# Patient Record
Sex: FEMALE | Race: WHITE | URBAN - METROPOLITAN AREA
[De-identification: names, ages, dates, MRNs, and addresses within clinical notes are randomized per-mention and may not be internally consistent; named-entity substitution may affect disease eponyms.]

---

## 2017-04-25 ENCOUNTER — APPOINTMENT (RX ONLY)
Dept: URBAN - METROPOLITAN AREA CLINIC 169 | Facility: CLINIC | Age: 67
Setting detail: DERMATOLOGY
End: 2017-04-25

## 2017-04-25 DIAGNOSIS — L63.8 OTHER ALOPECIA AREATA: ICD-10-CM

## 2017-04-25 PROCEDURE — 99202 OFFICE O/P NEW SF 15 MIN: CPT

## 2017-04-25 PROCEDURE — ? COUNSELING

## 2017-04-25 PROCEDURE — ? PRESCRIPTION

## 2017-04-25 PROCEDURE — ? TREATMENT REGIMEN

## 2017-04-25 PROCEDURE — ? DIAGNOSIS COMMENT

## 2017-04-25 RX ORDER — FLUOCINONIDE 0.5 MG/ML
1 SOLUTION TOPICAL QD
Qty: 1 | Refills: 0 | Status: ERX | COMMUNITY
Start: 2017-04-25

## 2017-04-25 RX ADMIN — FLUOCINONIDE 1: 0.5 SOLUTION TOPICAL at 00:00

## 2017-04-25 ASSESSMENT — SEVERITY ASSESSMENT OVERALL AMONG ALL PATIENTS
IN YOUR EXPERIENCE, AMONG ALL PATIENTS YOU HAVE SEEN WITH THIS CONDITION, HOW SEVERE IS THIS PATIENT'S CONDITION?: S2 (25-49% HAIR LOSS)

## 2017-04-25 NOTE — PROCEDURE: TREATMENT REGIMEN
Otc Regimen: Rogaine for men 5% foam or solution daily
Detail Level: Zone
Plan: Patient informed to follow up with PCP for further workup/ treatment on the abnormal thyroid\\n\\nDiscussed squaric acid as an option in the future
Initiate Treatment: Fluocinonide solution apply daily to the scalp

## 2017-04-25 NOTE — PROCEDURE: DIAGNOSIS COMMENT
Comment: Lab work came back that thyroid was \"abnormal\" from PCP, patient does not have results today but states is being further worked up. \\nHad a previous reaction to oral prednisone many years ago so  ILK is not an option.  She will do a test spot of the topical steroid (fluocinonide solution) to ensure she tolerates it well and will not treat when alone.
Detail Level: Detailed

## 2017-04-25 NOTE — HPI: HAIR LOSS
How Did The Hair Loss Occur?: sudden in onset
How Severe Is Your Hair Loss?: moderate
What Hair Products Do You Use?: Root 600

## 2017-05-03 ENCOUNTER — APPOINTMENT (RX ONLY)
Dept: URBAN - METROPOLITAN AREA CLINIC 169 | Facility: CLINIC | Age: 67
Setting detail: DERMATOLOGY
End: 2017-05-03

## 2017-05-03 DIAGNOSIS — L63.8 OTHER ALOPECIA AREATA: ICD-10-CM

## 2017-05-03 PROCEDURE — ? SQUARIC ACID CONTACTANT THERAPY

## 2017-05-03 PROCEDURE — ? PRESCRIPTION

## 2017-05-03 PROCEDURE — ? COUNSELING

## 2017-05-03 PROCEDURE — 99213 OFFICE O/P EST LOW 20 MIN: CPT

## 2017-05-03 PROCEDURE — ? TREATMENT REGIMEN

## 2017-05-03 PROCEDURE — ? DIAGNOSIS COMMENT

## 2017-05-03 RX ORDER — TRIAMCINOLONE ACETONIDE 1 MG/G
CREAM TOPICAL QD
Qty: 1 | Refills: 0 | Status: ERX | COMMUNITY
Start: 2017-05-03

## 2017-05-03 RX ADMIN — TRIAMCINOLONE ACETONIDE 1: 1 CREAM TOPICAL at 00:00

## 2017-05-03 ASSESSMENT — LOCATION ZONE DERM: LOCATION ZONE: ARM

## 2017-05-03 ASSESSMENT — LOCATION DETAILED DESCRIPTION DERM: LOCATION DETAILED: RIGHT PROXIMAL POSTERIOR UPPER ARM

## 2017-05-03 ASSESSMENT — LOCATION SIMPLE DESCRIPTION DERM: LOCATION SIMPLE: RIGHT POSTERIOR UPPER ARM

## 2017-05-03 NOTE — PROCEDURE: TREATMENT REGIMEN
Initiate Treatment: Triamcinolone 0.1% cream Apply to affected area up to 4 times a day if needed for intense reaction
Plan: Squaric acid immunotherapy sensitization on the right arm today.  Expected pruritus and inflammatory reaction described in detail.  Patient to call or return to clinic with any concerns.  Plan to treat scalp at follow up
Detail Level: Zone

## 2017-05-03 NOTE — PROCEDURE: DIAGNOSIS COMMENT
Comment: Had a previous reaction to oral prednisone many years ago so  ILK is not an option.  We discussed squaric acid as a treatment. All side effects were explained, all questions were answered
Detail Level: Detailed

## 2017-05-03 NOTE — PROCEDURE: SQUARIC ACID CONTACTANT THERAPY
Consent: Verbal Consent was obtained from the patient. The risks of squaric acid application were discussed in detail. Specifically, the risks of redness, itching, pain and allergic reactions were addressed. Prior to application all of the patient's questions were answered.
Detail Level: Detailed
Strength: Squaric Acid 5%
Post-Care Instructions: I reviewed with the patient in detail post-care instructions. The patient understands that the treated areas should be washed off 4 to 6 hours after application. They were instructed to call with any concerns.

## 2017-05-17 ENCOUNTER — APPOINTMENT (RX ONLY)
Dept: URBAN - METROPOLITAN AREA CLINIC 169 | Facility: CLINIC | Age: 67
Setting detail: DERMATOLOGY
End: 2017-05-17

## 2017-05-17 DIAGNOSIS — L63.8 OTHER ALOPECIA AREATA: ICD-10-CM

## 2017-05-17 PROCEDURE — 99213 OFFICE O/P EST LOW 20 MIN: CPT

## 2017-05-17 PROCEDURE — ? PRESCRIPTION

## 2017-05-17 PROCEDURE — ? COUNSELING

## 2017-05-17 PROCEDURE — ? SQUARIC ACID CONTACTANT THERAPY

## 2017-05-17 PROCEDURE — ? TREATMENT REGIMEN

## 2017-05-17 RX ORDER — TRIAMCINOLONE ACETONIDE 1 MG/G
1 CREAM TOPICAL QD
Qty: 1 | Refills: 0 | Status: ERX

## 2017-05-17 ASSESSMENT — LOCATION SIMPLE DESCRIPTION DERM
LOCATION SIMPLE: RIGHT EYEBROW
LOCATION SIMPLE: SCALP
LOCATION SIMPLE: LEFT POSTERIOR UPPER ARM
LOCATION SIMPLE: LEFT EYEBROW

## 2017-05-17 ASSESSMENT — LOCATION DETAILED DESCRIPTION DERM
LOCATION DETAILED: LEFT CENTRAL EYEBROW
LOCATION DETAILED: LEFT DISTAL POSTERIOR UPPER ARM
LOCATION DETAILED: LEFT SUPERIOR PARIETAL SCALP
LOCATION DETAILED: RIGHT CENTRAL EYEBROW
LOCATION DETAILED: LEFT PROXIMAL LATERAL POSTERIOR UPPER ARM

## 2017-05-17 ASSESSMENT — LOCATION ZONE DERM
LOCATION ZONE: FACE
LOCATION ZONE: SCALP
LOCATION ZONE: ARM

## 2017-05-17 NOTE — PROCEDURE: TREATMENT REGIMEN
Plan: Squaric acid %10 immunotherapy sensitization on the left arm today (no reaction from 5% squaric acid).  Expected pruritus and inflammatory reaction described in detail.  Patient to call or return to clinic with any concerns.  Plan to treat scalp at follow up
Detail Level: Zone
Initiate Treatment: Triamcinolone 0.1% cream Apply to affected area up to 4 times a day if needed for intense reaction

## 2017-05-17 NOTE — PROCEDURE: SQUARIC ACID CONTACTANT THERAPY
Post-Care Instructions: I reviewed with the patient in detail post-care instructions. The patient understands that the treated areas should be washed off 4 to 6 hours after application. They were instructed to call with any concerns.
Detail Level: Detailed
Consent: Verbal Consent was obtained from the patient. The risks of squaric acid application were discussed in detail. Specifically, the risks of redness, itching, pain and allergic reactions were addressed. Prior to application all of the patient's questions were answered.
Strength: Squaric Acid 5%

## 2017-06-08 ENCOUNTER — APPOINTMENT (RX ONLY)
Dept: URBAN - METROPOLITAN AREA CLINIC 169 | Facility: CLINIC | Age: 67
Setting detail: DERMATOLOGY
End: 2017-06-08

## 2017-06-08 DIAGNOSIS — L63.8 OTHER ALOPECIA AREATA: ICD-10-CM

## 2017-06-08 PROCEDURE — ? SQUARIC ACID CONTACTANT THERAPY

## 2017-06-08 PROCEDURE — ? TREATMENT REGIMEN

## 2017-06-08 PROCEDURE — 99213 OFFICE O/P EST LOW 20 MIN: CPT

## 2017-06-08 PROCEDURE — ? COUNSELING

## 2017-06-08 PROCEDURE — ? DIAGNOSIS COMMENT

## 2017-06-08 ASSESSMENT — LOCATION DETAILED DESCRIPTION DERM
LOCATION DETAILED: SUPERIOR MID FOREHEAD
LOCATION DETAILED: RIGHT CENTRAL EYEBROW
LOCATION DETAILED: LEFT SUPERIOR PARIETAL SCALP
LOCATION DETAILED: RIGHT CENTRAL PARIETAL SCALP
LOCATION DETAILED: LEFT CENTRAL EYEBROW
LOCATION DETAILED: MID-FRONTAL SCALP

## 2017-06-08 ASSESSMENT — LOCATION SIMPLE DESCRIPTION DERM
LOCATION SIMPLE: LEFT EYEBROW
LOCATION SIMPLE: ANTERIOR SCALP
LOCATION SIMPLE: SUPERIOR FOREHEAD
LOCATION SIMPLE: RIGHT EYEBROW
LOCATION SIMPLE: SCALP

## 2017-06-08 ASSESSMENT — LOCATION ZONE DERM
LOCATION ZONE: FACE
LOCATION ZONE: SCALP

## 2017-06-08 NOTE — PROCEDURE: TREATMENT REGIMEN
Plan: Patient has Triamcinolone cream on hand in case reaction becomes too intense and uncomfortable.  Will call me if that does not control any reaction.  Expected results and treatment course again reviewed with the patient. Discussed sun protection
Detail Level: Zone

## 2017-06-08 NOTE — PROCEDURE: SQUARIC ACID CONTACTANT THERAPY
Post-Care Instructions: I reviewed with the patient in detail post-care instructions. The patient understands that the treated areas should be washed off 4 to 6 hours after application. They were instructed to call with any concerns.
Consent: Verbal Consent was obtained from the patient. The risks of squaric acid application were discussed in detail. Specifically, the risks of redness, itching, pain and allergic reactions were addressed. Prior to application all of the patient's questions were answered.
Strength: Squaric Acid 10%
Detail Level: Simple

## 2017-06-08 NOTE — PROCEDURE: DIAGNOSIS COMMENT
Detail Level: Detailed
Comment: Squaric acid %10 immunotherapy sensitization on the left arm had a  strong reaction a week after application. Did not require triamcinolone cream.

## 2017-06-19 ENCOUNTER — RX ONLY (OUTPATIENT)
Age: 67
Setting detail: RX ONLY
End: 2017-06-19

## 2017-06-19 RX ORDER — FLUOCINOLONE ACETONIDE 0.11 MG/ML
1 OIL TOPICAL BID
Qty: 1 | Refills: 0 | Status: ERX | COMMUNITY
Start: 2017-06-19

## 2017-06-29 ENCOUNTER — APPOINTMENT (RX ONLY)
Dept: URBAN - METROPOLITAN AREA CLINIC 169 | Facility: CLINIC | Age: 67
Setting detail: DERMATOLOGY
End: 2017-06-29

## 2017-06-29 DIAGNOSIS — L82.1 OTHER SEBORRHEIC KERATOSIS: ICD-10-CM

## 2017-06-29 DIAGNOSIS — L63.8 OTHER ALOPECIA AREATA: ICD-10-CM

## 2017-06-29 DIAGNOSIS — L70.8 OTHER ACNE: ICD-10-CM

## 2017-06-29 PROCEDURE — ? DIAGNOSIS COMMENT

## 2017-06-29 PROCEDURE — ? COUNSELING

## 2017-06-29 PROCEDURE — 99213 OFFICE O/P EST LOW 20 MIN: CPT

## 2017-06-29 PROCEDURE — ? TREATMENT REGIMEN

## 2017-06-29 PROCEDURE — ? SQUARIC ACID CONTACTANT THERAPY

## 2017-06-29 ASSESSMENT — LOCATION DETAILED DESCRIPTION DERM
LOCATION DETAILED: MID-FRONTAL SCALP
LOCATION DETAILED: RIGHT CENTRAL PARIETAL SCALP
LOCATION DETAILED: LEFT SUPERIOR PARIETAL SCALP
LOCATION DETAILED: RIGHT CENTRAL EYEBROW
LOCATION DETAILED: SUPERIOR MID FOREHEAD
LOCATION DETAILED: RIGHT INFERIOR MEDIAL MALAR CHEEK
LOCATION DETAILED: LEFT CENTRAL EYEBROW
LOCATION DETAILED: LEFT SUPERIOR MEDIAL MALAR CHEEK

## 2017-06-29 ASSESSMENT — LOCATION ZONE DERM
LOCATION ZONE: FACE
LOCATION ZONE: SCALP

## 2017-06-29 ASSESSMENT — LOCATION SIMPLE DESCRIPTION DERM
LOCATION SIMPLE: RIGHT CHEEK
LOCATION SIMPLE: SUPERIOR FOREHEAD
LOCATION SIMPLE: RIGHT EYEBROW
LOCATION SIMPLE: ANTERIOR SCALP
LOCATION SIMPLE: LEFT EYEBROW
LOCATION SIMPLE: SCALP
LOCATION SIMPLE: LEFT CHEEK

## 2017-06-29 NOTE — PROCEDURE: DIAGNOSIS COMMENT
Comment: Today I was not as generous with the squaric acid application as patient had a more significant inflammatory reaction to first treatment.  Patient is comfortable doing squaric acid 10% today with only applying a thin layer and declines no treatment or 5% only.  She did have significant hair regrowth.
Detail Level: Detailed

## 2017-06-29 NOTE — PROCEDURE: TREATMENT REGIMEN
Detail Level: Zone
Otc Regimen: Patient has OTC oil that helps with irritation
Discontinue Regimen: Triamcinolone 0.1% cream, patient did not tolerate well
Plan: If patient is not able to tolerate reaction with her current otc oil she will call and we will inform her how to get the derma smoothe oil coupon

## 2017-06-29 NOTE — PROCEDURE: SQUARIC ACID CONTACTANT THERAPY
Consent: Verbal Consent was obtained from the patient. The risks of squaric acid application were discussed in detail. Specifically, the risks of redness, itching, pain and allergic reactions were addressed. Prior to application all of the patient's questions were answered.
Strength: Squaric Acid 10%
Post-Care Instructions: I reviewed with the patient in detail post-care instructions. The patient understands that the treated areas should be washed off 4 to 6 hours after application. They were instructed to call with any concerns.
Detail Level: Simple

## 2017-07-27 ENCOUNTER — APPOINTMENT (RX ONLY)
Dept: URBAN - METROPOLITAN AREA CLINIC 169 | Facility: CLINIC | Age: 67
Setting detail: DERMATOLOGY
End: 2017-07-27

## 2017-07-27 DIAGNOSIS — L63.8 OTHER ALOPECIA AREATA: ICD-10-CM | Status: IMPROVED

## 2017-07-27 PROBLEM — L85.3 XEROSIS CUTIS: Status: ACTIVE | Noted: 2017-07-27

## 2017-07-27 PROBLEM — L29.8 OTHER PRURITUS: Status: ACTIVE | Noted: 2017-07-27

## 2017-07-27 PROCEDURE — ? TREATMENT REGIMEN

## 2017-07-27 PROCEDURE — 99213 OFFICE O/P EST LOW 20 MIN: CPT

## 2017-07-27 PROCEDURE — ? COUNSELING

## 2017-07-27 ASSESSMENT — LOCATION DETAILED DESCRIPTION DERM: LOCATION DETAILED: LEFT SUPERIOR PARIETAL SCALP

## 2017-07-27 ASSESSMENT — LOCATION ZONE DERM: LOCATION ZONE: SCALP

## 2017-07-27 ASSESSMENT — LOCATION SIMPLE DESCRIPTION DERM: LOCATION SIMPLE: SCALP

## 2017-07-27 NOTE — PROCEDURE: TREATMENT REGIMEN
Samples Given: Neutrogena Hydroboost gel cream to apply to the face
Plan: Patient declines any prescription medications to help with itching at this time.  She will call if she would like me to call in dermasmooth or hydrocortisone cream, at this time she would like to save the money.  I counseled her that scalp does reveal significant new hair growth and there is no erythema, blisters, or appreciated inflammation at this time.  She will notify me should itching not resolve or should she continue to lose hair.  No treatments indicated at this time.  All questions answered.  Patient may dye her hair and have it styled/cut once itching has resolved as it could cause further irritation to her scalp at this time.
Initiate Treatment: Taclonex solution apply 4 drops into scalp BID to help with itching (samples only provided)
Detail Level: Zone

## 2019-03-19 ENCOUNTER — OFFICE VISIT (OUTPATIENT)
Dept: URBAN - METROPOLITAN AREA CLINIC 32 | Facility: CLINIC | Age: 69
End: 2019-03-19
Payer: COMMERCIAL

## 2019-03-19 DIAGNOSIS — H40.1131 PRIMARY OPEN-ANGLE GLAUCOMA, BILATERAL, MILD STAGE: Primary | ICD-10-CM

## 2019-03-19 PROCEDURE — 99213 OFFICE O/P EST LOW 20 MIN: CPT | Performed by: OPHTHALMOLOGY

## 2019-03-19 RX ORDER — TIMOLOL MALEATE 5 MG/ML
0.5 % SOLUTION/ DROPS OPHTHALMIC
Qty: 15 | Refills: 3 | Status: INACTIVE
Start: 2019-03-19 | End: 2019-05-08

## 2019-03-19 ASSESSMENT — KERATOMETRY
OS: 46.00
OD: 43.13

## 2019-03-19 ASSESSMENT — INTRAOCULAR PRESSURE
OD: 15
OS: 15

## 2019-03-19 NOTE — IMPRESSION/PLAN
Impression: Dry eye syndrome of bilateral lacrimal glands: H04.123. Condition: quality of life issue. Symptoms: will treat with meds. Plan: Discussed diagnosis in detail with patient. Discussed treatment options with patient. Discussed risks of progression. Patient instructed to use artificial tears as needed. Recommend oral omega 3, use humidifier, avoid fans.

## 2019-03-19 NOTE — IMPRESSION/PLAN
Impression: Primary open-angle glaucoma, bilateral, mild stage: H40.1131. IOP efectively lowered with Timolol, ONH stable OU, Pach's normal no need to adjust IOP's Plan: Discussed diagnosis in detail with patient. Discussed treatment options with patient. Current therapy is best for patient. Continue using current medication(s). Timolol QD OU. Will continue to observe condition and or symptoms. Call if [address] worsens.

## 2019-04-11 ENCOUNTER — OFFICE VISIT (OUTPATIENT)
Dept: URBAN - METROPOLITAN AREA CLINIC 32 | Facility: CLINIC | Age: 69
End: 2019-04-11
Payer: COMMERCIAL

## 2019-04-11 DIAGNOSIS — H52.4 PRESBYOPIA: ICD-10-CM

## 2019-04-11 PROCEDURE — 92014 COMPRE OPH EXAM EST PT 1/>: CPT | Performed by: OPTOMETRIST

## 2019-04-11 PROCEDURE — 92015 DETERMINE REFRACTIVE STATE: CPT | Performed by: OPTOMETRIST

## 2019-04-11 PROCEDURE — 92004 COMPRE OPH EXAM NEW PT 1/>: CPT | Performed by: OPTOMETRIST

## 2019-04-11 ASSESSMENT — VISUAL ACUITY
OD: 20/20
OS: 20/25

## 2019-04-11 ASSESSMENT — INTRAOCULAR PRESSURE
OS: 12
OD: 12

## 2019-10-01 ENCOUNTER — OFFICE VISIT (OUTPATIENT)
Dept: URBAN - METROPOLITAN AREA CLINIC 32 | Facility: CLINIC | Age: 69
End: 2019-10-01
Payer: COMMERCIAL

## 2019-10-01 PROCEDURE — 99213 OFFICE O/P EST LOW 20 MIN: CPT | Performed by: OPHTHALMOLOGY

## 2019-10-01 PROCEDURE — 92133 CPTRZD OPH DX IMG PST SGM ON: CPT | Performed by: OPHTHALMOLOGY

## 2019-10-01 ASSESSMENT — VISUAL ACUITY
OD: 20/20
OS: 20/20

## 2019-10-01 ASSESSMENT — KERATOMETRY
OD: 42.88
OS: 45.75

## 2019-10-01 NOTE — IMPRESSION/PLAN
Impression: Presbyopia: H52.4. Plan: Discussed diagnosis in detail with patient. Discussed treatment options with patient. New glasses Rx was given today.

## 2019-10-01 NOTE — IMPRESSION/PLAN
Impression: Age-related nuclear cataract, bilateral: H25.13. Plan: Discussed diagnosis in detail with patient. No treatment is required at this time. Discussed risks of progression. Will continue to observe condition and or symptoms. Call if [address] worsens.

## 2019-10-01 NOTE — IMPRESSION/PLAN
Impression: Primary open-angle glaucoma, bilateral, mild stage: H40.1131. IOP efectively lowered with Timolol, ONH stable OU, Pach's normal no need to adjust IOP's Plan: Discussed diagnosis in detail with patient. Discussed treatment options with patient. Current therapy is best for patient. Continue using current medication(s). Timolol QD OU. Will continue to observe condition and or symptoms. Call if [address] worsens.

## 2020-05-19 ENCOUNTER — OFFICE VISIT (OUTPATIENT)
Dept: URBAN - METROPOLITAN AREA CLINIC 32 | Facility: CLINIC | Age: 70
End: 2020-05-19
Payer: COMMERCIAL

## 2020-05-19 PROCEDURE — 92133 CPTRZD OPH DX IMG PST SGM ON: CPT | Performed by: OPTOMETRIST

## 2020-05-19 PROCEDURE — 92014 COMPRE OPH EXAM EST PT 1/>: CPT | Performed by: OPTOMETRIST

## 2020-05-19 ASSESSMENT — INTRAOCULAR PRESSURE
OD: 21
OS: 20

## 2020-05-19 NOTE — IMPRESSION/PLAN
Impression: Primary open-angle glaucoma, bilateral, mild stage: H40.1131. IOP efectively lowered with Timolol, ONH stable OU, Pach's normal no need to adjust IOP's Plan: Discussed diagnosis in detail with patient. Discussed treatment options with patient. Current therapy is best for patient. Continue using current medication(s). Timolol QD OU. Will continue to observe condition and or symptoms. Call if [address] worsens.

## 2020-05-19 NOTE — IMPRESSION/PLAN
Impression: Age-related nuclear cataract, bilateral: H25.13. Plan: Cataracts account for the patient's complaints. Patient education was discussed regarding cataracts, lens options and surgery. Recommend Cataract consultation with surgeon. Order A-Scan and Corneal Topography. Hold off on filling any new glasses prescriptions until after the consultation. RTC 1-2 weeks for cataract consultation.

## 2020-05-26 ENCOUNTER — OFFICE VISIT (OUTPATIENT)
Dept: URBAN - METROPOLITAN AREA CLINIC 32 | Facility: CLINIC | Age: 70
End: 2020-05-26
Payer: COMMERCIAL

## 2020-05-26 DIAGNOSIS — H25.13 AGE-RELATED NUCLEAR CATARACT, BILATERAL: Primary | ICD-10-CM

## 2020-05-26 DIAGNOSIS — H04.123 DRY EYE SYNDROME OF BILATERAL LACRIMAL GLANDS: ICD-10-CM

## 2020-05-26 PROCEDURE — 99214 OFFICE O/P EST MOD 30 MIN: CPT | Performed by: OPHTHALMOLOGY

## 2020-05-26 ASSESSMENT — INTRAOCULAR PRESSURE
OS: 18
OD: 16

## 2020-05-26 ASSESSMENT — VISUAL ACUITY
OD: 20/30
OS: 20/25

## 2020-05-26 NOTE — IMPRESSION/PLAN
Impression: Dry eye syndrome of bilateral lacrimal glands: H04.123. Plan: Discussed diagnosis in detail with patient. Discussed treatment options with patient. Discussed risks of progression. Patient instructed to use artificial tears as needed. Will continue to observe condition and or symptoms. Call if [address] worsens.

## 2020-05-26 NOTE — IMPRESSION/PLAN
Impression: Primary open-angle glaucoma, bilateral, mild stage: H40.1131. IOP efectively lowered with Timolol, ONH stable OU, Pach's normal no need to adjust IOP's Plan: Discussed diagnosis in detail with patient. Discussed treatment options with patient. Current therapy is best for patient. Continue using current medication(s). Timolol QD OU. Will continue to observe condition and or symptoms. Call if [address] worsens.

## 2020-05-26 NOTE — IMPRESSION/PLAN
Impression: Age-related nuclear cataract, bilateral: H25.13. S/P RK OD, Monovision OD distance and OS near. Plan: Discussed diagnosis in detail with patient. No treatment is required at this time. Discussed risks of progression. Will continue to observe condition and or symptoms. Call if [address] worsens.  OK to proceed with refraction and new glasses at patient convenience

## 2020-05-26 NOTE — IMPRESSION/PLAN
Impression: Presbyopia: H52.4. Plan: Discussed diagnosis in detail with patient. Discussed treatment options with patient. New glasses Rx was given today.  Patient will update glassess RX first.

## 2020-09-15 ENCOUNTER — OFFICE VISIT (OUTPATIENT)
Dept: URBAN - METROPOLITAN AREA CLINIC 32 | Facility: CLINIC | Age: 70
End: 2020-09-15
Payer: COMMERCIAL

## 2020-09-15 DIAGNOSIS — H17.89 OTHER CORNEAL SCARS AND OPACITIES: ICD-10-CM

## 2020-09-15 PROCEDURE — 92133 CPTRZD OPH DX IMG PST SGM ON: CPT | Performed by: OPHTHALMOLOGY

## 2020-09-15 PROCEDURE — 99213 OFFICE O/P EST LOW 20 MIN: CPT | Performed by: OPHTHALMOLOGY

## 2020-09-15 ASSESSMENT — INTRAOCULAR PRESSURE
OS: 17
OD: 16

## 2020-09-15 NOTE — IMPRESSION/PLAN
Impression: Age-related nuclear cataract, bilateral: H25.13. Plan: Discussed diagnosis in detail with patient. No treatment is required at this time. Discussed risks of progression. Will continue to observe condition and or symptoms. Call if [address] worsens.

## 2020-09-15 NOTE — IMPRESSION/PLAN
Impression: Other corneal scars and opacities: H17.89. Right. S/P RK OD Plan: Discussed diagnosis in detail with patient. No treatment is required at this time. No progression expected. Will continue to observe condition and or symptoms.

## 2020-09-15 NOTE — IMPRESSION/PLAN
Impression: Primary open-angle glaucoma, bilateral, mild stage: H40.1131. IOP/ONH Stable OU, Plan: OCT OU ordered and performed today and results discussed with patient. Discussed diagnosis in detail with patient. Discussed treatment options with patient. Current therapy is best for patient. Continue using current medication(s). Timolol QAM OU. Stressed compliance. Observe.

## 2020-11-06 ENCOUNTER — APPOINTMENT (RX ONLY)
Dept: URBAN - METROPOLITAN AREA CLINIC 176 | Facility: CLINIC | Age: 70
Setting detail: DERMATOLOGY
End: 2020-11-06

## 2020-11-06 DIAGNOSIS — Z41.9 ENCOUNTER FOR PROCEDURE FOR PURPOSES OTHER THAN REMEDYING HEALTH STATE, UNSPECIFIED: ICD-10-CM

## 2020-11-06 PROCEDURE — ? HYDRAFACIAL

## 2020-11-06 ASSESSMENT — LOCATION ZONE DERM: LOCATION ZONE: FACE

## 2020-11-06 ASSESSMENT — LOCATION DETAILED DESCRIPTION DERM: LOCATION DETAILED: RIGHT MEDIAL MALAR CHEEK

## 2020-11-06 ASSESSMENT — LOCATION SIMPLE DESCRIPTION DERM: LOCATION SIMPLE: RIGHT CHEEK

## 2020-11-06 NOTE — PROCEDURE: HYDRAFACIAL
Additional Vacuum Pressure (Won't Render If 0): 0
Indication: anti-aging
Price (Use Numbers Only, No Special Characters Or $): 199.00
Post-Care Instructions: I reviewed with the patient in detail post-care instructions. Patient should stay away from the sun and wear sun protection until treated areas are fully healed.
Treatment Number: 1
Number Of Passes: 2
Consent: Written consent obtained, risks reviewed including but not limited to crusting, scabbing, blistering, scarring, darker or lighter pigmentary change, bruising, and/or incomplete response.
Detail Level: Zone
Vacuum Pressure: 15

## 2021-03-24 ENCOUNTER — OFFICE VISIT (OUTPATIENT)
Dept: URBAN - METROPOLITAN AREA CLINIC 32 | Facility: CLINIC | Age: 71
End: 2021-03-24
Payer: COMMERCIAL

## 2021-03-24 DIAGNOSIS — H40.013 OPEN ANGLE WITH BORDERLINE FINDINGS, LOW RISK, BILATERAL: ICD-10-CM

## 2021-03-24 PROCEDURE — 92133 CPTRZD OPH DX IMG PST SGM ON: CPT | Performed by: OPTOMETRIST

## 2021-03-24 PROCEDURE — 92083 EXTENDED VISUAL FIELD XM: CPT | Performed by: OPTOMETRIST

## 2021-03-24 PROCEDURE — 99214 OFFICE O/P EST MOD 30 MIN: CPT | Performed by: OPTOMETRIST

## 2021-03-24 ASSESSMENT — INTRAOCULAR PRESSURE
OS: 16
OD: 17

## 2021-09-22 ENCOUNTER — OFFICE VISIT (OUTPATIENT)
Dept: URBAN - METROPOLITAN AREA CLINIC 32 | Facility: CLINIC | Age: 71
End: 2021-09-22
Payer: COMMERCIAL

## 2021-09-22 PROCEDURE — 99214 OFFICE O/P EST MOD 30 MIN: CPT | Performed by: OPTOMETRIST

## 2021-09-22 PROCEDURE — 92133 CPTRZD OPH DX IMG PST SGM ON: CPT | Performed by: OPTOMETRIST

## 2021-09-22 ASSESSMENT — INTRAOCULAR PRESSURE
OD: 12
OS: 13

## 2021-09-22 NOTE — IMPRESSION/PLAN
Impression: Primary open-angle glaucoma, bilateral, mild stage: H40.1131. IOP/ONH Stable OU, Plan: OCT OU ordered and performed today and results discussed with patient. Discussed diagnosis in detail with patient. Discussed treatment options with patient. Current therapy is best for patient. Continue using current medication(s). Timolol QAM OU. Stressed compliance. Observe.  [address]

## 2022-09-02 ENCOUNTER — OFFICE VISIT (OUTPATIENT)
Dept: URBAN - METROPOLITAN AREA CLINIC 52 | Facility: CLINIC | Age: 72
End: 2022-09-02
Payer: COMMERCIAL

## 2022-09-02 DIAGNOSIS — H25.13 AGE-RELATED NUCLEAR CATARACT, BILATERAL: ICD-10-CM

## 2022-09-02 DIAGNOSIS — H43.813 VITREOUS DEGENERATION, BILATERAL: ICD-10-CM

## 2022-09-02 DIAGNOSIS — H40.1131 PRIMARY OPEN-ANGLE GLAUCOMA, BILATERAL, MILD STAGE: Primary | ICD-10-CM

## 2022-09-02 DIAGNOSIS — H35.373 PUCKERING OF MACULA, BILATERAL: ICD-10-CM

## 2022-09-02 PROCEDURE — 92134 CPTRZ OPH DX IMG PST SGM RTA: CPT | Performed by: OPTOMETRIST

## 2022-09-02 PROCEDURE — 92014 COMPRE OPH EXAM EST PT 1/>: CPT | Performed by: OPTOMETRIST

## 2022-09-02 PROCEDURE — 92133 CPTRZD OPH DX IMG PST SGM ON: CPT | Performed by: OPTOMETRIST

## 2022-09-02 ASSESSMENT — INTRAOCULAR PRESSURE
OS: 16
OD: 16

## 2022-09-02 ASSESSMENT — KERATOMETRY
OD: 42.75
OS: 45.75

## 2022-09-02 ASSESSMENT — VISUAL ACUITY
OS: 20/25
OD: 20/25

## 2022-09-02 NOTE — IMPRESSION/PLAN
Impression: Primary open-angle glaucoma, bilateral, mild stage: H40.1131. IOP/ONH Stable OU, Plan: Ordered and reviewed RNFL OCT today IOPs well-controlled with Timolol QAM OU - continue drops RNFL shows Large C/D OU, BDL thin superior and inferior OS, BDL thin inferior OS Current therapy is best for patient. Continue using current medication(s). Timolol QAM OU. Stressed compliance. Observe. RTC 1 year for Comprehensive Eye Exam with Dr. Gabriel owen  RNFL OCT

## 2022-09-02 NOTE — IMPRESSION/PLAN
Impression: Age-related nuclear cataract, bilateral: H25.13. Plan: Educated patient on exam findings and discussed natural history of condition. Instructed patient to call immediately if any flashes of light, increased quantity and/or density of floaters, and/or curtain of vision loss are noticed. Continue to monitor annually.

## 2022-09-02 NOTE — IMPRESSION/PLAN
Impression: Age-related nuclear cataract, bilateral: H25.13. Plan: Educated patient on exam findings and discussed natural history of diagnosis. No surgical intervention indicated at this time. Monitor annually with DFE/ MAC OCT. Ordered and Reviewed MAC OCT today.

## 2023-02-20 ENCOUNTER — OFFICE VISIT (OUTPATIENT)
Dept: URBAN - METROPOLITAN AREA CLINIC 52 | Facility: CLINIC | Age: 73
End: 2023-02-20
Payer: COMMERCIAL

## 2023-02-20 DIAGNOSIS — H01.001 BLEPHARITIS OF RIGHT UPPER EYELID: ICD-10-CM

## 2023-02-20 DIAGNOSIS — H40.1131 PRIMARY OPEN-ANGLE GLAUCOMA, BILATERAL, MILD STAGE: ICD-10-CM

## 2023-02-20 DIAGNOSIS — H00.011 HORDEOLUM EXTERNUM RIGHT UPPER EYELID: Primary | ICD-10-CM

## 2023-02-20 PROCEDURE — 99213 OFFICE O/P EST LOW 20 MIN: CPT | Performed by: OPHTHALMOLOGY

## 2023-02-20 RX ORDER — DOXYCYCLINE HYCLATE 50 MG/1
50 MG CAPSULE, GELATIN COATED ORAL
Qty: 60 | Refills: 0 | Status: ACTIVE
Start: 2023-02-20

## 2023-02-20 ASSESSMENT — INTRAOCULAR PRESSURE
OD: 19
OS: 17
OS: 14
OD: 16

## 2023-02-20 NOTE — IMPRESSION/PLAN
Impression: Hordeolum externum right upper eyelid: H00.011. Plan: Discussed diagnosis with patient. Will have patient start Doxycycline 50mg PO QD for 60 days then discontinue - sent into pharmacy. Lid scrubs, warm compresses, and lid hygiene were explained. Patient instructed to apply warm compresses. Patient instructed to use artificial tears as needed. Discussed hordeolum vs skin cancer .  if no improvement consider referral  to Dr. Kris Mccarthy

## 2023-02-20 NOTE — IMPRESSION/PLAN
Impression: Primary open-angle glaucoma, bilateral, mild stage: H40.1131. Plan:  Continue using current medication(s). Timolol QAM OU. Stressed compliance. After lid lesion issue resolved will continue care w/ Dr. Jessica Jefferson for continued f/u care w/ POAG.

## 2023-02-22 ENCOUNTER — TESTING ONLY (OUTPATIENT)
Dept: URBAN - METROPOLITAN AREA CLINIC 52 | Facility: CLINIC | Age: 73
End: 2023-02-22
Payer: COMMERCIAL

## 2023-02-22 PROCEDURE — 92083 EXTENDED VISUAL FIELD XM: CPT | Performed by: OPHTHALMOLOGY

## 2023-03-20 ENCOUNTER — OFFICE VISIT (OUTPATIENT)
Dept: URBAN - METROPOLITAN AREA CLINIC 52 | Facility: CLINIC | Age: 73
End: 2023-03-20
Payer: COMMERCIAL

## 2023-03-20 DIAGNOSIS — H40.1131 PRIMARY OPEN-ANGLE GLAUCOMA, BILATERAL, MILD STAGE: ICD-10-CM

## 2023-03-20 DIAGNOSIS — H00.011 HORDEOLUM EXTERNUM RIGHT UPPER EYELID: Primary | ICD-10-CM

## 2023-03-20 PROCEDURE — 99213 OFFICE O/P EST LOW 20 MIN: CPT | Performed by: OPHTHALMOLOGY

## 2023-03-20 ASSESSMENT — INTRAOCULAR PRESSURE
OD: 16
OS: 16

## 2023-03-20 NOTE — IMPRESSION/PLAN
Impression: Hordeolum externum right upper eyelid: H00.011. Plan: Discussed diagnosis with patient. Will have patient continue Lid scrubs, warm compresses, and lid hygiene as explained. Patient instructed to apply warm compresses. Patient instructed to use artificial tears as needed. Discussed hordeolum vs skin cancer .  mild improvement; referral  to Dr. Nik Barnes

## 2023-03-20 NOTE — IMPRESSION/PLAN
Impression: Primary open-angle glaucoma, bilateral, mild stage: H40.1131. Plan: Continue using current medication(s). Timolol QAM OU. Stressed compliance. After lid lesion issue resolved will continue care w/ Dr. Michael Villalobos for continued f/u care w/ POAG.

## 2023-03-30 ENCOUNTER — OFFICE VISIT (OUTPATIENT)
Dept: URBAN - METROPOLITAN AREA CLINIC 52 | Facility: CLINIC | Age: 73
End: 2023-03-30
Payer: COMMERCIAL

## 2023-03-30 DIAGNOSIS — H00.11 CHALAZION RIGHT UPPER LID: Primary | ICD-10-CM

## 2023-03-30 DIAGNOSIS — H00.14 CHALAZION LEFT UPPER LID: ICD-10-CM

## 2023-03-30 PROCEDURE — 92014 COMPRE OPH EXAM EST PT 1/>: CPT | Performed by: OPHTHALMOLOGY

## 2023-03-30 RX ORDER — TOBRAMYCIN AND DEXAMETHASONE 3; 1 MG/G; MG/G
OINTMENT OPHTHALMIC
Qty: 3.5 | Refills: 0 | Status: ACTIVE
Start: 2023-03-30

## 2023-03-30 NOTE — IMPRESSION/PLAN
Impression: Chalazion right upper lid: H00.11. Plan: Small Resolving chalazion RUL. 
Continue to monitor, will prescribe tobradex rené at bed time 7-10 days with warm compresses

## 2023-03-30 NOTE — IMPRESSION/PLAN
Impression: Chalazion left upper lid: H00.14. Plan: Small Resolving chalazion MIKHAIL. 
Continue to monitor, will prescribe tobradex rené at bed time 7-10 days with warm compresses

## 2023-06-28 ENCOUNTER — OFFICE VISIT (OUTPATIENT)
Dept: URBAN - METROPOLITAN AREA CLINIC 52 | Facility: CLINIC | Age: 73
End: 2023-06-28
Payer: MEDICARE

## 2023-06-28 DIAGNOSIS — H40.1131 PRIMARY OPEN-ANGLE GLAUCOMA, BILATERAL, MILD STAGE: Primary | ICD-10-CM

## 2023-06-28 PROCEDURE — 99213 OFFICE O/P EST LOW 20 MIN: CPT | Performed by: OPTOMETRIST

## 2023-06-28 ASSESSMENT — INTRAOCULAR PRESSURE
OS: 14
OD: 14

## 2023-06-28 NOTE — IMPRESSION/PLAN
Impression: Primary open-angle glaucoma, bilateral, mild stage: H40.1131. IOP/ONH Stable OU, Plan: IOPs well-controlled with Timolol QAM OU - continue drops IOP today 14/14 Previous RNFL shows Large C/D OU, BDL thin superior and inferior OS, BDL thin inferior OS Current therapy is best for patient. Continue using current medication(s). Timolol QAM OU. Stressed compliance. Observe. RTC 3mos  for Comprehensive Eye Exam with Dr. Denise owen  RNFL OCT

## 2023-09-27 ENCOUNTER — OFFICE VISIT (OUTPATIENT)
Dept: URBAN - METROPOLITAN AREA CLINIC 52 | Facility: CLINIC | Age: 73
End: 2023-09-27
Payer: MEDICARE

## 2023-09-27 DIAGNOSIS — H40.1132 PRIMARY OPEN-ANGLE GLAUCOMA, MODERATE STAGE, BILATERAL: Primary | ICD-10-CM

## 2023-09-27 PROCEDURE — 92083 EXTENDED VISUAL FIELD XM: CPT | Performed by: OPTOMETRIST

## 2023-09-27 PROCEDURE — 99214 OFFICE O/P EST MOD 30 MIN: CPT | Performed by: OPTOMETRIST

## 2023-09-27 PROCEDURE — 76514 ECHO EXAM OF EYE THICKNESS: CPT | Performed by: OPTOMETRIST

## 2023-09-27 PROCEDURE — 92133 CPTRZD OPH DX IMG PST SGM ON: CPT | Performed by: OPTOMETRIST

## 2023-09-27 RX ORDER — BRIMONIDINE TARTRATE 2 MG/ML
0.2 % SOLUTION/ DROPS OPHTHALMIC
Qty: 10 | Refills: 3 | Status: ACTIVE
Start: 2023-09-27

## 2023-09-27 ASSESSMENT — INTRAOCULAR PRESSURE
OS: 17
OD: 17

## 2023-09-27 ASSESSMENT — VISUAL ACUITY
OS: 20/25
OD: 20/40

## 2023-10-11 ENCOUNTER — OFFICE VISIT (OUTPATIENT)
Dept: URBAN - METROPOLITAN AREA CLINIC 52 | Facility: CLINIC | Age: 73
End: 2023-10-11
Payer: MEDICARE

## 2023-10-11 DIAGNOSIS — H40.1132 PRIMARY OPEN-ANGLE GLAUCOMA, MODERATE STAGE, BILATERAL: Primary | ICD-10-CM

## 2023-10-11 PROCEDURE — 99213 OFFICE O/P EST LOW 20 MIN: CPT | Performed by: OPTOMETRIST

## 2023-10-11 ASSESSMENT — INTRAOCULAR PRESSURE
OS: 10
OD: 10

## 2024-01-31 ENCOUNTER — OFFICE VISIT (OUTPATIENT)
Dept: URBAN - METROPOLITAN AREA CLINIC 52 | Facility: CLINIC | Age: 74
End: 2024-01-31
Payer: MEDICARE

## 2024-01-31 DIAGNOSIS — H40.1132 PRIMARY OPEN-ANGLE GLAUCOMA, MODERATE STAGE, BILATERAL: Primary | ICD-10-CM

## 2024-01-31 PROCEDURE — 99214 OFFICE O/P EST MOD 30 MIN: CPT | Performed by: OPTOMETRIST

## 2024-01-31 ASSESSMENT — INTRAOCULAR PRESSURE
OD: 14
OS: 15

## 2024-08-05 ENCOUNTER — OFFICE VISIT (OUTPATIENT)
Dept: URBAN - METROPOLITAN AREA CLINIC 43 | Facility: CLINIC | Age: 74
End: 2024-08-05
Payer: MEDICARE

## 2024-08-05 DIAGNOSIS — H25.813 COMBINED FORMS OF AGE-RELATED CATARACT, BILATERAL: ICD-10-CM

## 2024-08-05 DIAGNOSIS — H40.1132 PRIMARY OPEN-ANGLE GLAUCOMA, MODERATE STAGE, BILATERAL: Primary | ICD-10-CM

## 2024-08-05 PROCEDURE — 76514 ECHO EXAM OF EYE THICKNESS: CPT | Performed by: OPHTHALMOLOGY

## 2024-08-05 PROCEDURE — 92133 CPTRZD OPH DX IMG PST SGM ON: CPT | Performed by: OPHTHALMOLOGY

## 2024-08-05 PROCEDURE — 92004 COMPRE OPH EXAM NEW PT 1/>: CPT | Performed by: OPHTHALMOLOGY

## 2024-08-05 ASSESSMENT — INTRAOCULAR PRESSURE
OD: 11
OS: 11

## 2024-08-05 ASSESSMENT — VISUAL ACUITY
OD: 20/25
OS: 20/30

## 2024-12-04 ENCOUNTER — OFFICE VISIT (OUTPATIENT)
Dept: URBAN - METROPOLITAN AREA CLINIC 43 | Facility: CLINIC | Age: 74
End: 2024-12-04
Payer: MEDICARE

## 2024-12-04 DIAGNOSIS — H40.1132 PRIMARY OPEN-ANGLE GLAUCOMA, BILATERAL, MODERATE STAGE: Primary | ICD-10-CM

## 2024-12-04 PROCEDURE — 92012 INTRM OPH EXAM EST PATIENT: CPT | Performed by: OPHTHALMOLOGY

## 2024-12-04 PROCEDURE — 92083 EXTENDED VISUAL FIELD XM: CPT | Performed by: OPHTHALMOLOGY

## 2024-12-04 ASSESSMENT — INTRAOCULAR PRESSURE
OD: 13
OS: 13

## 2025-04-07 ENCOUNTER — OFFICE VISIT (OUTPATIENT)
Dept: URBAN - METROPOLITAN AREA CLINIC 43 | Facility: CLINIC | Age: 75
End: 2025-04-07
Payer: MEDICARE

## 2025-04-07 DIAGNOSIS — H25.813 COMBINED FORMS OF AGE-RELATED CATARACT, BILATERAL: ICD-10-CM

## 2025-04-07 DIAGNOSIS — H40.1132 PRIMARY OPEN-ANGLE GLAUCOMA, BILATERAL, MODERATE STAGE: Primary | ICD-10-CM

## 2025-04-07 PROCEDURE — 92012 INTRM OPH EXAM EST PATIENT: CPT | Performed by: OPHTHALMOLOGY

## 2025-04-07 ASSESSMENT — INTRAOCULAR PRESSURE
OD: 13
OS: 12